# Patient Record
Sex: MALE | Race: WHITE | Employment: OTHER | URBAN - METROPOLITAN AREA
[De-identification: names, ages, dates, MRNs, and addresses within clinical notes are randomized per-mention and may not be internally consistent; named-entity substitution may affect disease eponyms.]

---

## 2021-04-09 NOTE — TELEPHONE ENCOUNTER
FUTURE VISIT INFORMATION      FUTURE VISIT INFORMATION:    Date: 5.3.21    Time: 1:15    Location: [facility]  REFERRAL INFORMATION:    Referring provider:  N/A    Referring providers clinic:  N/A    Reason for visit/diagnosis: Consult for eyelid surgery - heavy eyelids - needs eye exam too - referred  by friend    RECORDS REQUESTED FROM:       Clinic name Comments Records Status Imaging Status

## 2021-05-03 ENCOUNTER — PRE VISIT (OUTPATIENT)
Dept: OPHTHALMOLOGY | Facility: CLINIC | Age: 51
End: 2021-05-03

## 2021-05-03 ENCOUNTER — OFFICE VISIT (OUTPATIENT)
Dept: OPHTHALMOLOGY | Facility: CLINIC | Age: 51
End: 2021-05-03
Payer: COMMERCIAL

## 2021-05-03 ENCOUNTER — TELEPHONE (OUTPATIENT)
Dept: OPHTHALMOLOGY | Facility: CLINIC | Age: 51
End: 2021-05-03

## 2021-05-03 VITALS — WEIGHT: 190 LBS | BODY MASS INDEX: 29.82 KG/M2 | HEIGHT: 67 IN

## 2021-05-03 DIAGNOSIS — H02.834 DERMATOCHALASIS OF BOTH UPPER EYELIDS: Primary | ICD-10-CM

## 2021-05-03 DIAGNOSIS — H02.831 DERMATOCHALASIS OF BOTH UPPER EYELIDS: Primary | ICD-10-CM

## 2021-05-03 DIAGNOSIS — H02.9 EYELID LESION, BENIGN: ICD-10-CM

## 2021-05-03 PROCEDURE — 92002 INTRM OPH EXAM NEW PATIENT: CPT | Mod: GC | Performed by: OPHTHALMOLOGY

## 2021-05-03 PROCEDURE — 92285 EXTERNAL OCULAR PHOTOGRAPHY: CPT | Mod: GC | Performed by: OPHTHALMOLOGY

## 2021-05-03 PROCEDURE — 92082 INTERMEDIATE VISUAL FIELD XM: CPT | Mod: GC | Performed by: OPHTHALMOLOGY

## 2021-05-03 RX ORDER — DOLUTEGRAVIR SODIUM 50 MG/1
TABLET, FILM COATED ORAL EVERY MORNING
COMMUNITY
Start: 2019-10-12

## 2021-05-03 RX ORDER — INSULIN GLARGINE 300 U/ML
INJECTION, SOLUTION SUBCUTANEOUS DAILY
COMMUNITY
Start: 2021-04-10

## 2021-05-03 RX ORDER — METOPROLOL SUCCINATE 50 MG/1
50 TABLET, EXTENDED RELEASE ORAL EVERY MORNING
COMMUNITY
Start: 2021-04-10

## 2021-05-03 RX ORDER — PRAVASTATIN SODIUM 10 MG
10 TABLET ORAL EVERY MORNING
COMMUNITY

## 2021-05-03 RX ORDER — EMTRICITABINE, RILPIVIRINE HYDROCHLORIDE, AND TENOFOVIR DISOPROXIL FUMARATE 200; 25; 300 MG/1; MG/1; MG/1
TABLET, FILM COATED ORAL EVERY MORNING
COMMUNITY
Start: 2020-07-13

## 2021-05-03 RX ORDER — LISINOPRIL 10 MG/1
10 TABLET ORAL EVERY MORNING
COMMUNITY
Start: 2020-07-07 | End: 2021-08-20

## 2021-05-03 RX ORDER — INSULIN ASPART 100 [IU]/ML
INJECTION, SOLUTION INTRAVENOUS; SUBCUTANEOUS
COMMUNITY
Start: 2021-04-08

## 2021-05-03 RX ORDER — OMEGA-3-ACID ETHYL ESTERS 1 G/1
CAPSULE, LIQUID FILLED ORAL EVERY MORNING
COMMUNITY
Start: 2021-02-01

## 2021-05-03 ASSESSMENT — SLIT LAMP EXAM - LIDS: COMMENTS: DERMATOCHALASIS WITH SKIN RESTING ON LASHES

## 2021-05-03 ASSESSMENT — TONOMETRY
IOP_METHOD: ICARE
OD_IOP_MMHG: 16
OS_IOP_MMHG: 19

## 2021-05-03 ASSESSMENT — CONF VISUAL FIELD
METHOD: COUNTING FINGERS
OS_SUPERIOR_NASAL_RESTRICTION: 3
OS_SUPERIOR_TEMPORAL_RESTRICTION: 3
OD_SUPERIOR_TEMPORAL_RESTRICTION: 3

## 2021-05-03 ASSESSMENT — LAGOPHTHALMOS
OS_LAGOPHTHALMOS: 0
OD_LAGOPHTHALMOS: 0

## 2021-05-03 ASSESSMENT — EXTERNAL EXAM - LEFT EYE: OS_EXAM: NORMAL

## 2021-05-03 ASSESSMENT — VISUAL ACUITY
METHOD: SNELLEN - LINEAR
OD_SC: 20/20
OS_SC: 20/20

## 2021-05-03 ASSESSMENT — LEVATOR FUNCTION
OD_LEVATOR: 15
OS_LEVATOR: 15

## 2021-05-03 ASSESSMENT — EXTERNAL EXAM - RIGHT EYE: OD_EXAM: NORMAL

## 2021-05-03 ASSESSMENT — MIFFLIN-ST. JEOR: SCORE: 1675.46

## 2021-05-03 ASSESSMENT — MARGIN REFLEX DISTANCE
OD_MRD1: 2
OS_MRD1: 2

## 2021-05-03 NOTE — NURSING NOTE
Chief Complaints and History of Present Illnesses   Patient presents with     Consult For     Self referred reports heavy both upper lids.     Chief Complaint(s) and History of Present Illness(es)     Consult For     Laterality: both eyes    Associated symptoms: dryness, eye pain, redness and tearing    Comments: Self referred reports heavy both upper lids.              Comments     Patient states both upper lids dropping has been bothering the past 2 years.   Having to use forehead and really work to get each eye open wider. Has been getting headaches.   This zayra to be getting in the way of vision. Two growths on the peripheral corner of left upper lid the past 2 months and this seem to get the way of vision as well. They seem to be greater in size. No change in color or discharge. Lid itching with each eye.  Would like growths removed today if possible.     Yvette Ball, COT COT 1:18 PM May 3, 2021

## 2021-05-03 NOTE — PATIENT INSTRUCTIONS
BLEPHAROPLASTY    Your eyes are often the first thing people notice about you and are an important aspect of your overall appearance. As we age, the tone and shape of our eyelids can loosen and sag. Heredity and sun exposure also contribute to this process. This excess, puffy or lax skin can make you appear more tired or appear older. Eyelid surgery or blepharoplasty (pronounced  yndz-e-ut-plasty ) can give the eyes a more youthful look by removing excess skin, bulging fat, and lax muscle from the upper or lower eyelids. If the sagging upper eyelid skin obstructs peripheral vision, blepharoplasty can eliminate the obstruction and expand the visual field.     Upper Blepharoplasty     For the upper eyelids, excess skin and fat are removed through an incision hidden in the natural eyelid crease. If the lid is droopy (ptosis), the muscle that raises the upper eyelid can be tightened. The incision is then closed with fine sutures.     Lower Blepharoplasty     Fat in the lower eyelids can be removed or repositioned through an incision hidden on the inner surface of the eyelid.  If there is excessive skin in the lower lid, the skin can be removed through incision is made just below the lashes. Fat can be removed or repositioned through this incision, and the excess skin removed. The incision is then closed with fine sutures.     Upper and Lower Blepharoplasty     Upper and lower blepharoplasty can be performed together and also can be combined with other procedures such as eyebrow or forehead lift, midface lift, face lift, neck lift, or laser skin resurfacing.  The procedures are typically performed as an outpatient procedure and typically take 45 min to 1.5 hours to perform.  Most patients can return to normal activities within 1-2 weeks. Makeup may be worn to camouflage any bruising after one week.       Who Should Perform A Blepharoplasty?     When choosing a surgeon to perform blepharoplasty, look for a cosmetic and  reconstructive surgeon who specializes in the eyelids, orbit, and tear drain system. Dr. Freitas s membership in the American Society of Ophthalmic Plastic and Reconstructive Surgery (ASOPRS) indicates he is not only a board certified ophthalmologist who knows the anatomy and structure of the eyelids and orbit, but also has had extensive training in ophthalmic plastic reconstructive and cosmetic surgery.

## 2021-05-03 NOTE — TELEPHONE ENCOUNTER
Spoke with patient to schedule surgery with Dr. Freitas    Surgery was scheduled on 6/9 at [facility]  Patient will have H&P at [facility]     Patient is aware a COVID-19 test is needed before their procedure. The test should be with-in 4 days of their procedure.   Test Details: Date 6/7 Location [facility]    Post-Op visit was scheduled on 6/21  Patient is aware a / is needed day of surgery.   Surgery packet was mailed 5/3, patient has my direct contact information for any further questions.

## 2021-05-03 NOTE — NURSING NOTE
Chief Complaints and History of Present Illnesses   Patient presents with     Consult For     Self referred reports heavy both upper lids.     Chief Complaint(s) and History of Present Illness(es)     Consult For     Laterality: both eyes    Associated symptoms: dryness, eye pain, redness and tearing    Comments: Self referred reports heavy both upper lids.              Comments     Patient states both upper lids dropping has been bothering the past 2 years.   Having to use forehead and really work to get each eye open wider. Has been getting headaches.   This zayra to be getting in the way of vision. Two growths on the peripheral corner of left upper lid the past 2 months and this seem to get the way of vision as well. They seem to be greater in size. No change in color or discharge. Lid itching with each eye.  Would like growths removed today if possible.   No results found for: A1C      Yvette Ball, COT COT 1:18 PM May 3, 2021

## 2021-05-03 NOTE — PROGRESS NOTES
Chief Complaints and History of Present Illnesses   Patient presents with     Consult For     Self referred reports heavy both upper lids.     Chief Complaint(s) and History of Present Illness(es)     Consult For     In both eyes.  Associated symptoms include dryness, eye pain, redness and   tearing. Additional comments: Self referred reports heavy both upper lids.                Comments     Patient states both upper lids dropping has been bothering the past 2   years.   Having to use forehead and really work to get each eye open wider. Has   been getting headaches.   This zayra to be getting in the way of vision. Two growths on the peripheral   corner of left upper lid the past 2 months and this seem to get the way of   vision as well. They seem to be greater in size. No change in color or   discharge. Lid itching with each eye.  Would like growths removed today if   possible.   No results found for: A1C      Yvette Ball, COT COT 1:18 PM May 3, 2021            FUNCTIONAL COMPLAINTS RELATED TO DROOPY EYELIDS/BROWS:  Akira Bianchi describes upper lids interfering with superior visual field and interfering with activities of daily living including reading, driving and watching television.     EXAM:   Dominant eye left     MRD1: Right eye 2   Left eye 2  Dermatochalasis with excess skin touching eyelashes      VISUAL FIELD:  Right eye untaped:10 degrees Right eye taped:55 degrees  Left eye untaped:10 degrees Left eye taped:55 degrees    Right eye visual field improves by: 45 degrees  Left eye visual field improves by: 45 degrees           Assessment & Plan     Akira Bianchi is a 51 year old male with the following diagnoses:   1. Dermatochalasis of both upper eyelids    2. Eyelid lesion, benign       PLAN:  Bilateral upper blepharoplasty (S+sup orbic), skin lesions will be included in bleph excision          David Terrazas MD, MPH  Oculoplastics Fellow    Attending Physician Attestation:  Complete  documentation of historical and exam elements from today's encounter can be found in the full encounter summary report (not reduplicated in this progress note).  I personally obtained the chief complaint(s) and history of present illness.  I confirmed and edited as necessary the review of systems, past medical/surgical history, family history, social history, and examination findings as documented by others; and I examined the patient myself.  I personally reviewed the relevant tests, images, and reports as documented above.  I formulated and edited as necessary the assessment and plan and discussed the findings and management plan with the patient and family.  I personally reviewed the ophthalmic test(s) associated with this encounter, agree with the interpretation(s) as documented by the resident/fellow, and have edited the corresponding report(s) as necessary.   -Nirav Freitas MD    Today with Akira Bianchi, I reviewed the indications, risks, benefits, and alternatives of the proposed surgical procedure including, but not limited to, failure obtain the desired result  and need for additional surgery, bleeding, infection, loss of vision, loss of the eye, and the remote possibility of permanent damage to any organ system or death with the use of anesthesia.  I provided multiple opportunities for the questions, answered all questions to the best of my ability, and confirmed that my answers and my discussion were understood.     - Nirav Freitas MD 2:10 PM 5/3/2021

## 2021-05-04 NOTE — TELEPHONE ENCOUNTER
FUTURE VISIT INFORMATION      SURGERY INFORMATION:    Date: 21    Location: [facility]    Surgeon:  Nirav Freitas MD    Anesthesia Type:  Combined MAC with Local    Procedure: Bilateral upper eyelid blepharoplasty    Consult: ov 5/3/21    RECORDS REQUESTED FROM:       Most recent EKG+ Tracin13- Yusef

## 2021-05-22 DIAGNOSIS — Z11.59 ENCOUNTER FOR SCREENING FOR OTHER VIRAL DISEASES: ICD-10-CM

## 2021-06-06 ENCOUNTER — ANESTHESIA EVENT (OUTPATIENT)
Dept: SURGERY | Facility: AMBULATORY SURGERY CENTER | Age: 51
End: 2021-06-06
Payer: COMMERCIAL

## 2021-06-06 NOTE — ANESTHESIA PREPROCEDURE EVALUATION
Anesthesia Pre-Procedure Evaluation    Patient: Akira Bianchi   MRN: [id number] : 1970        Preoperative Diagnosis: Dermatochalasis of both upper eyelids [H02.831, H02.834]   Procedure : Procedure(s):  Bilateral upper eyelid blepharoplasty     No past medical history on file.   Past Surgical History:   Procedure Laterality Date     LASIK Bilateral       Allergies   Allergen Reactions     Glucotrol [Glipizide]      Tricor [Fenofibrate]       Social History     Tobacco Use     Smoking status: Not on file   Substance Use Topics     Alcohol use: Not on file      Wt Readings from Last 1 Encounters:   21 86.2 kg (190 lb)        Anesthesia Evaluation   Pt has had prior anesthetic. Type: General.    No history of anesthetic complications       ROS/MED HX  ENT/Pulmonary: Comment: Pulmonary blebs.   [id number] lungs collapsed requiring.   S/p pleurodesis.        (+) tobacco use, Past use,  (-) sleep apnea   Neurologic:     (+) peripheral neuropathy, - right two fingers. related to previous C6-C7 surgery.     Cardiovascular: Comment:  s/p stent x 1 in [place]. ( )    (+) Dyslipidemia hypertension-----    METS/Exercise Tolerance: >4 METS    Hematologic:  - neg hematologic  ROS     Musculoskeletal: Comment: History of C6-C7 surgery ( )      GI/Hepatic: Comment: Takes antacids regularly. Diet related.     (+) GERD, Other,     Renal/Genitourinary:  - neg Renal ROS     Endo:     (+) type I DM, Last HgA1c: 2020, date: 7.1, Using insulin, not previously admitted for DM/DKA.     Psychiatric/Substance Use:  - neg psychiatric ROS     Infectious Disease:     (+) HIV,     Malignancy:  - neg malignancy ROS     Other:            Physical Exam    Airway  airway exam normal           Respiratory Devices and Support         Dental  no notable dental history         Cardiovascular   cardiovascular exam normal          Pulmonary   pulmonary exam normal                OUTSIDE LABS:  CBC: No results found for:  WBC, HGB, HCT, PLT  BMP: No results found for: NA, POTASSIUM, CHLORIDE, CO2, BUN, CR, GLC  COAGS: No results found for: PTT, INR, FIBR  POC: No results found for: BGM, HCG, HCGS  HEPATIC: No results found for: ALBUMIN, PROTTOTAL, ALT, AST, GGT, ALKPHOS, BILITOTAL, BILIDIRECT, LANIE  OTHER: No results found for: PH, LACT, A1C, GERBER, PHOS, MAG, LIPASE, AMYLASE, TSH, T4, T3, CRP, SED    Anesthesia Plan    ASA Status:  2   NPO Status:  NPO Appropriate    Anesthesia Type: MAC.     - Reason for MAC: straight local not clinically adequate   Induction: Intravenous.   Maintenance: TIVA.        Consents    Anesthesia Plan(s) and associated risks, benefits, and realistic alternatives discussed. Questions answered and patient/representative(s) expressed understanding.     - Discussed with:  Patient      - Extended Intubation/Ventilatory Support Discussed: No.      - Patient is DNR/DNI Status: No    Use of blood products discussed: No .     Postoperative Care    Pain management: Multi-modal analgesia.   PONV prophylaxis: Background Propofol Infusion     Comments:              PAC Discussion and Assessment    ASA Classification: 3  Case is suitable for: [facility]  Anesthetic techniques and relevant risks discussed: MAC with GA as backup  Invasive monitoring and risk discussed: No    Possibility and Risk of blood transfusion discussed: No            [facility] Resident/NP Anesthesia Assessment: Abilio Bianchi is a Scheduled for Dermatochalasis of both upper eyelids on 6/9/2021 by Fortino in treatment of Bilateral upper eyelid blepharoplasty . PAC referral for risk assessment and optimization for anesthesia with comorbid conditions of the following :  Pre-operative considerations:  1. Cardiac: Functional status- METS 4.  low  risk surgery with RCRI: low risk of major adverse cardiac event.   s/p stent x 1 in [place]. ( 2007) Denies CP or KRAMER.   HTN- controlled on lisinopril and metoprolol. HL on statins.   2. Pulm: Airway feasible. JAVY #  3/8 of risks. Former smoker . Denies SOB   hx of pulmonary blebs. s/p   0379-7159 lungs collapsed requiring chest tubes. S/p pleurodesis in 2018.  3. GI: Risk of PONV score = 1. If > 2, anti-emetic intervention recommended) DM1 requiring insulin. Last A1C 11/2020 7.1%  + GERD   4.Heme: Hx of HIV  5. Musc: previous C5-C7 fusion.   6. Neuro: right two fingers numb-   - VTE risk: 0.5%  Patient is optimized and is acceptable candidate for the proposed procedure. No further diagnostic evaluation is needed.  For further details of assessment, testing, and physical exam please see H and P completed on same date.    Reviewed and Signed by PAC Mid-Level Provider/Resident  Mid-Level Provider/Resident: Bhakti LEES CNP  Date: 6/7/2021                                 NABEEL Mosley CNP       Antoine Weiss MD  Staff Anesthesiologist  [phone]

## 2021-06-07 ENCOUNTER — PRE VISIT (OUTPATIENT)
Dept: SURGERY | Facility: CLINIC | Age: 51
End: 2021-06-07

## 2021-06-07 ENCOUNTER — OFFICE VISIT (OUTPATIENT)
Dept: SURGERY | Facility: CLINIC | Age: 51
End: 2021-06-07
Payer: COMMERCIAL

## 2021-06-07 ENCOUNTER — APPOINTMENT (OUTPATIENT)
Dept: LAB | Facility: CLINIC | Age: 51
End: 2021-06-07
Payer: COMMERCIAL

## 2021-06-07 VITALS
BODY MASS INDEX: 30.97 KG/M2 | WEIGHT: 197.3 LBS | DIASTOLIC BLOOD PRESSURE: 83 MMHG | HEART RATE: 84 BPM | SYSTOLIC BLOOD PRESSURE: 124 MMHG | OXYGEN SATURATION: 93 % | TEMPERATURE: 97.7 F | HEIGHT: 67 IN | RESPIRATION RATE: 20 BRPM

## 2021-06-07 DIAGNOSIS — Z11.59 ENCOUNTER FOR SCREENING FOR OTHER VIRAL DISEASES: ICD-10-CM

## 2021-06-07 DIAGNOSIS — Z01.818 PRE-OP EVALUATION: Primary | ICD-10-CM

## 2021-06-07 DIAGNOSIS — Z01.818 PRE-OP EVALUATION: ICD-10-CM

## 2021-06-07 LAB
ANION GAP SERPL CALCULATED.3IONS-SCNC: 8 MMOL/L (ref 3–14)
BUN SERPL-MCNC: 13 MG/DL (ref 7–30)
CALCIUM SERPL-MCNC: 9.6 MG/DL (ref 8.5–10.1)
CHLORIDE SERPL-SCNC: 106 MMOL/L (ref 94–109)
CO2 SERPL-SCNC: 26 MMOL/L (ref 20–32)
CREAT SERPL-MCNC: 0.95 MG/DL (ref 0.66–1.25)
GFR SERPL CREATININE-BSD FRML MDRD: >90 ML/MIN/{1.73_M2}
GLUCOSE SERPL-MCNC: 160 MG/DL (ref 70–99)
HBA1C MFR BLD: 8.5 % (ref 0–5.6)
LABORATORY COMMENT REPORT: NORMAL
POTASSIUM SERPL-SCNC: 4 MMOL/L (ref 3.4–5.3)
SARS-COV-2 RNA RESP QL NAA+PROBE: NEGATIVE
SARS-COV-2 RNA RESP QL NAA+PROBE: NORMAL
SODIUM SERPL-SCNC: 140 MMOL/L (ref 133–144)
SPECIMEN SOURCE: NORMAL
SPECIMEN SOURCE: NORMAL

## 2021-06-07 PROCEDURE — 80048 BASIC METABOLIC PNL TOTAL CA: CPT | Performed by: PATHOLOGY

## 2021-06-07 PROCEDURE — U0005 INFEC AGEN DETEC AMPLI PROBE: HCPCS | Mod: 90 | Performed by: PATHOLOGY

## 2021-06-07 PROCEDURE — 36415 COLL VENOUS BLD VENIPUNCTURE: CPT | Performed by: PATHOLOGY

## 2021-06-07 PROCEDURE — U0003 INFECTIOUS AGENT DETECTION BY NUCLEIC ACID (DNA OR RNA); SEVERE ACUTE RESPIRATORY SYNDROME CORONAVIRUS 2 (SARS-COV-2) (CORONAVIRUS DISEASE [COVID-19]), AMPLIFIED PROBE TECHNIQUE, MAKING USE OF HIGH THROUGHPUT TECHNOLOGIES AS DESCRIBED BY CMS-2020-01-R: HCPCS | Mod: 90 | Performed by: PATHOLOGY

## 2021-06-07 PROCEDURE — 99204 OFFICE O/P NEW MOD 45 MIN: CPT | Performed by: NURSE PRACTITIONER

## 2021-06-07 PROCEDURE — 83036 HEMOGLOBIN GLYCOSYLATED A1C: CPT | Performed by: PATHOLOGY

## 2021-06-07 RX ORDER — TRIPROLIDINE/PSEUDOEPHEDRINE 2.5MG-60MG
1 TABLET ORAL EVERY MORNING
COMMUNITY

## 2021-06-07 ASSESSMENT — PAIN SCALES - GENERAL: PAINLEVEL: NO PAIN (0)

## 2021-06-07 ASSESSMENT — MIFFLIN-ST. JEOR: SCORE: 1708.58

## 2021-06-07 ASSESSMENT — LIFESTYLE VARIABLES: TOBACCO_USE: 1

## 2021-06-07 NOTE — H&P (VIEW-ONLY)
Pre-Operative H & P     CC:  Preoperative exam to assess for increased cardiopulmonary risk while undergoing surgery and anesthesia.    Date of Encounter: 6/7/2021  Primary Care Physician:  No primary care provider on file.    Reason for visit: Dermatochalasis of both upper eyelids    HPI  Akira Bianchi is a 51 year old male who presents for pre-operative H & P in preparation for Bilateral upper eyelid blepharoplastywith Dr. Freitas on 6/9/21 at [facility].     Patient states both upper lids dropping has been bothering the past 2 years. Having to use forehead and really work to get each eye open wider. Has been getting headaches. This is getting in the way of his vision. Two growths on the peripheral corner of left upper lid the past 2 months and this seem to get the way of vision as well. Patient states that the growths are getting bigger in size. He complains of Lid itching with each eye. Patient consulted with Dr. Freitas and the above has been recommended for treatment.     PMH also siginficant for: CAD s/p coronary stent 2007, Pulmonary blebs s/p pleurodesis, HTN, HL, former smoker, HIV, spinal fusion.           History is obtained from the patient.     Past Medical History  Also includes :   CAD- s/p stent 20007  HTN   Pulmonary blebs/ s/p pleurodesis  HL  Spinal fusion  HIV  Former smoker      Past Surgical History  Past Surgical History:   Procedure Laterality Date     LASIK Bilateral    Spinal fusion C5-C7        Hx of Blood transfusions/reactions: no     Hx of abnormal bleeding or anti-platelet use: no    Menstrual history: No LMP for male patient.:     Steroid use in the last year: no    Personal or FH with difficulty with Anesthesia:  no    Prior to Admission Medications  Current Outpatient Medications   Medication Sig Dispense Refill     dolutegravir (TIVICAY) 50 MG tablet every morning        emtricitabine-rilpivir-tenofovir (COMPLERA) 200- MG TABS per tablet every  morning        insulin aspart (NOVOLOG PEN) 100 UNIT/ML pen Inject 180-200 Units Subcutaneous 3 times daily (with meals)        insulin glargine (LANTUS SOLOSTAR) 100 UNIT/ML pen Inject 140 Units Subcutaneous daily        lisinopril (ZESTRIL) 10 MG tablet Take 10 mg by mouth every morning        metoprolol succinate ER (TOPROL-XL) 50 MG 24 hr tablet 50 mg every morning        omega-3 acid ethyl esters (LOVAZA) 1 g capsule every morning        pravastatin (PRAVACHOL) 10 MG tablet Take 10 mg by mouth every morning        triprolidine-pseudoePHEDrine (APRODINE) 2.5-60 MG TABS per tablet Take 1 tablet by mouth every morning       NOVOLOG FLEXPEN 100 UNIT/ML soln        TOUJEO MAX SOLOSTAR 300 UNIT/ML SOPN daily          Allergies  Allergies   Allergen Reactions     Glucotrol [Glipizide]      Tricor [Fenofibrate]        Social History  Social History     Socioeconomic History     Marital status:      Spouse name: Not on file     Number of children: Not on file     Years of education: Not on file     Highest education level: Not on file   Occupational History     Not on file   Social Needs     Financial resource strain: Not on file     Food insecurity     Worry: Not on file     Inability: Not on file     Transportation needs     Medical: Not on file     Non-medical: Not on file   Tobacco Use     Smoking status: Never Smoker     Smokeless tobacco: Never Used   Substance and Sexual Activity     Alcohol use: Not on file     Drug use: Not on file     Sexual activity: Not on file   Lifestyle     Physical activity     Days per week: Not on file     Minutes per session: Not on file     Stress: Not on file   Relationships     Social connections     Talks on phone: Not on file     Gets together: Not on file     Attends Shinto service: Not on file     Active member of club or organization: Not on file     Attends meetings of clubs or organizations: Not on file     Relationship status: Not on file     Intimate partner  "violence     Fear of current or ex partner: Not on file     Emotionally abused: Not on file     Physically abused: Not on file     Forced sexual activity: Not on file   Other Topics Concern     Not on file   Social History Narrative     Not on file       Family History  No family history on file.    The complete review of systems is negative other than noted in the HPI or here.       ENT/Pulmonary: Comment: Pulmonary blebs.   6499-1949 lungs collapsed requiring.   S/p pleurodesis.        (+) tobacco use, Past use,  (-) sleep apnea   Neurologic:     (+) peripheral neuropathy, - right two fingers. related to previous C6-C7 surgery.     Cardiovascular: Comment:  s/p stent x 1 in [place]. ( 2007)    (+) Dyslipidemia hypertension-----    METS/Exercise Tolerance: >4 METS    Hematologic:  - neg hematologic  ROS     Musculoskeletal: Comment: History of C6-C7 surgery ( 2009)      GI/Hepatic: Comment: Takes antacids regularly. Diet related.     (+) GERD, Other,     Renal/Genitourinary:  - neg Renal ROS     Endo:     (+) type I DM, Last HgA1c: 11/25/2020, date: 7.1, Using insulin, not previously admitted for DM/DKA.     Psychiatric/Substance Use:  - neg psychiatric ROS     Infectious Disease:  - neg infectious disease ROS     Malignancy:  - neg malignancy ROS     Other:                Temp: 97.7  F (36.5  C) Temp src: Oral BP: 124/83 Pulse: 84   Resp: 20 SpO2: 93 %         197 lbs 4.8 oz  5' 7\"[pt reported[   Body mass index is 30.9 kg/m .       Physical Exam  Constitutional: Awake, alert, cooperative, no apparent distress, and appears stated age.  Eyes: Pupils equal, round and reactive to light, extra ocular muscles intact, sclera clear, conjunctiva normal.  HENT: Normocephalic, oral pharynx with moist mucus membranes, good dentition. No goiter appreciated.   Respiratory: Clear to auscultation bilaterally, no crackles or wheezing.  Cardiovascular: Regular rate and rhythm, normal S1 and S2, and no murmur noted.  Carotids " +2, no bruits. No edema. Palpable pulses to radial  DP and PT arteries.   GI: Normal bowel sounds, soft, non-distended, non-tender, no masses palpated, no hepatosplenomegaly.  Surgical scars: healed  Lymph/Hematologic: No cervical lymphadenopathy and no supraclavicular lymphadenopathy.  Genitourinary:  deferred  Skin: Warm and dry.  No rashes at anticipated surgical site.   Musculoskeletal: Full ROM of neck. There is no redness, warmth, or swelling of the joints. Gross motor strength is normal.    Neurologic: Awake, alert, oriented to name, place and time. Cranial nerves II-XII are grossly intact. Gait is normal.   Neuropsychiatric: Calm, cooperative. Normal affect.     Labs: (personally reviewed)  basic metabolic panel  Last Comprehensive Metabolic Panel:  Sodium   Date Value Ref Range Status   06/07/2021 140 133 - 144 mmol/L Final     Potassium   Date Value Ref Range Status   06/07/2021 4.0 3.4 - 5.3 mmol/L Final     Chloride   Date Value Ref Range Status   06/07/2021 106 94 - 109 mmol/L Final     Carbon Dioxide   Date Value Ref Range Status   06/07/2021 26 20 - 32 mmol/L Final     Anion Gap   Date Value Ref Range Status   06/07/2021 8 3 - 14 mmol/L Final     Glucose   Date Value Ref Range Status   06/07/2021 160 (H) 70 - 99 mg/dL Final     Urea Nitrogen   Date Value Ref Range Status   06/07/2021 13 7 - 30 mg/dL Final     Creatinine   Date Value Ref Range Status   06/07/2021 0.95 0.66 - 1.25 mg/dL Final     GFR Estimate   Date Value Ref Range Status   06/07/2021 >90 >60 mL/min/[1.73_m2] Final     Comment:     Non  GFR Calc  Starting 12/18/2018, serum creatinine based estimated GFR (eGFR) will be   calculated using the Chronic Kidney Disease Epidemiology Collaboration   (CKD-EPI) equation.       Calcium   Date Value Ref Range Status   06/07/2021 9.6 8.5 - 10.1 mg/dL Final       Outside records reviewed from: care everywhere    ASSESSMENT and PLAN  Abilio Bianchi is a Scheduled for Dermatochalasis  of both upper eyelids on 6/9/2021 by Fortino in treatment of Bilateral upper eyelid blepharoplasty . PAC referral for risk assessment and optimization for anesthesia with comorbid conditions of the following :  Pre-operative considerations:  1. Cardiac: Functional status- METS 4.  low  risk surgery with RCRI: low risk of major adverse cardiac event.   s/p stent x 1 in [place]. ( 2007) Denies CP or KRAMER.   HTN- controlled on lisinopril and metoprolol. HL on statins.   2. Pulm: Airway feasible. JAVY # 3/8 of risks. Former smoker . Denies SOB   hx of pulmonary blebs. s/p   [id number] lungs collapsed requiring chest tubes. S/p pleurodesis in 2018.  3. GI: Risk of PONV score = 1. If > 2, anti-emetic intervention recommended) DM1 requiring insulin. Last A1C 11/2020 7.1%. will obtain updated A1C today.   + GERD   4.Heme: Hx of HIV  5. Musc: previous C5-C7 fusion.   6. Neuro: right two fingers numb-   - VTE risk: 0.5%    Arrival time, NPO, shower and medication instructions provided by nursing staff today,please refer to the AVS completed by nursing.   Preparing For Your Surgery handout given in clinic.        NABEEL Mosley CNP  Preoperative Assessment Center  [facility]  Phone: [phone]  Fax: [phone]

## 2021-06-07 NOTE — PATIENT INSTRUCTIONS
Preparing for Your Surgery      Name:  Akira Bianchi   MRN:  [id number]   :  1970   Today's Date:  2021         Arriving for surgery:  Surgery date:  21  Arrival time:  11:15 am    Restrictions due to COVID 19:  One consistent visitor is allowed per patient  No ill visitors  All visitors must wear face mask     parking is available for anyone with mobility limitations or disabilities. (Monday- Friday 7 am- 5 pm)    Please come to:    [facility]  [address] [place] [phone]    Please check in on the 5th floor at the Ambulatory Surgery Center       What can I eat or drink?    -  You may eat and drink normally until 8 hours before surgery. (Until 4:45 am)  -  You may have clear liquids up to 4 hours before surgery. (Until 8:45 am)  Examples of clear liquids:  Water  Clear broth  Juices (apple, white grape, white cranberry  and cider) without pulp  Noncarbonated, powder based beverages  (lemonade and Ricardo-Aid)  Sodas (Sprite, 7-Up, ginger ale and seltzer)  Coffee or tea (without milk or cream)  Gatorade    --No alcohol for at least 24 hours before surgery    Which medicines can I take?    Hold Aspirin for 7 days before surgery.   Hold Multivitamins for 7 days before surgery.  Hold Supplements for 7 days before surgery. Hold Omega 3 Acid Ethyl Esters (Lovaza) now, if you have not already.  Hold Ibuprofen (Advil, Motrin) for 1 day before surgery--unless otherwise directed by surgeon.  Hold Naproxen (Aleve) for 4 days before surgery.    -  DO NOT take the following medications the day of surgery:  Aspart insulin    -  PLEASE TAKE the following medications the day of surgery   Dolutegravir (Tivicay), Emtricitabine-Rilpivir-Tenofovir (Complera), Lisinoprol, Metoprolol, Pravastatin (Pravachol), Triprolidine-Pseudoephedrine (Aprodine),   Acetaminophen (Tylenol) if needed    The morning of surgery, decrease the Glargine (Lantus) insulin to 112 units.    How do I  prepare myself?  - Please take 2 showers before surgery using Scrubcare or Hibiclens soap.    Use this soap only from the neck to your toes. Do not put the soap anywhere near your eyes.     Leave the soap on your skin for one minute--then rinse thoroughly.      You may use your own shampoo and conditioner; no other hair products.   - Please remove all jewelry and body piercings.  - No lotions, deodorants or fragrance.  - Bring your ID and insurance card.        - All patients are required to have a Covid-19 test within 4 days of surgery/procedure.      -Patients will be contacted by the [facility] scheduling team within 1 week of surgery to make an appointment.      - Patients may call the Scheduling team at [phone] if they have not been scheduled within 4 days of  surgery.      ALL PATIENTS ARE REQUIRED TO HAVE A RESPONSIBLE ADULT TO DRIVE AND BE IN ATTENDANCE WITH THEM FOR 24 HOURS FOLLOWING SURGERY       Questions or Concerns:    -For questions regarding the day of surgery please contact the Ambulatory Surgery Center at [phone].    -If you have health changes between today and your surgery please contact your surgeon.     For questions after surgery please call your surgeons office.    AFTER YOUR SURGERY  Breathing exercises   Breathing exercises help you recover faster. Take deep breaths and let the air out slowly. This will:     Help you wake up after surgery.    Help prevent complications like pneumonia.  Preventing complications will help you go home sooner.   Nausea and vomiting   You may feel sick to your stomach after surgery; if so, let your nurse know.    Pain control:  After surgery, you may have pain. Our goal is to help you manage your pain. Pain medicine will help you feel comfortable enough to do activities that will help you heal.  These activities may include breathing exercises, walking and physical therapy.   To help your health care team treat your pain we will ask: 1) If you  have pain  2) where it is located 3) describe your pain in your words  Methods of pain control include medications given by mouth, vein or by nerve block for some surgeries.  Sequential Compression Device (SCD):  You may need to wear SCD S (also called pneumo boots)on your legs or feet. These are wraps connected to a machine that pumps in air and releases it. The repeated pumping helps prevent blood clots from forming.

## 2021-06-07 NOTE — H&P
Pre-Operative H & P     CC:  Preoperative exam to assess for increased cardiopulmonary risk while undergoing surgery and anesthesia.    Date of Encounter: 6/7/2021  Primary Care Physician:  No primary care provider on file.    Reason for visit: Dermatochalasis of both upper eyelids    HPI  Akira Bianchi is a 51 year old male who presents for pre-operative H & P in preparation for Bilateral upper eyelid blepharoplastywith Dr. Freitas on 6/9/21 at [facility].     Patient states both upper lids dropping has been bothering the past 2 years. Having to use forehead and really work to get each eye open wider. Has been getting headaches. This is getting in the way of his vision. Two growths on the peripheral corner of left upper lid the past 2 months and this seem to get the way of vision as well. Patient states that the growths are getting bigger in size. He complains of Lid itching with each eye. Patient consulted with Dr. Freitas and the above has been recommended for treatment.     PMH also siginficant for: CAD s/p coronary stent 2007, Pulmonary blebs s/p pleurodesis, HTN, HL, former smoker, HIV, spinal fusion.           History is obtained from the patient.     Past Medical History  Also includes :   CAD- s/p stent 20007  HTN   Pulmonary blebs/ s/p pleurodesis  HL  Spinal fusion  HIV  Former smoker      Past Surgical History  Past Surgical History:   Procedure Laterality Date     LASIK Bilateral    Spinal fusion C5-C7        Hx of Blood transfusions/reactions: no     Hx of abnormal bleeding or anti-platelet use: no    Menstrual history: No LMP for male patient.:     Steroid use in the last year: no    Personal or FH with difficulty with Anesthesia:  no    Prior to Admission Medications  Current Outpatient Medications   Medication Sig Dispense Refill     dolutegravir (TIVICAY) 50 MG tablet every morning        emtricitabine-rilpivir-tenofovir (COMPLERA) 200- MG TABS per tablet every  morning        insulin aspart (NOVOLOG PEN) 100 UNIT/ML pen Inject 180-200 Units Subcutaneous 3 times daily (with meals)        insulin glargine (LANTUS SOLOSTAR) 100 UNIT/ML pen Inject 140 Units Subcutaneous daily        lisinopril (ZESTRIL) 10 MG tablet Take 10 mg by mouth every morning        metoprolol succinate ER (TOPROL-XL) 50 MG 24 hr tablet 50 mg every morning        omega-3 acid ethyl esters (LOVAZA) 1 g capsule every morning        pravastatin (PRAVACHOL) 10 MG tablet Take 10 mg by mouth every morning        triprolidine-pseudoePHEDrine (APRODINE) 2.5-60 MG TABS per tablet Take 1 tablet by mouth every morning       NOVOLOG FLEXPEN 100 UNIT/ML soln        TOUJEO MAX SOLOSTAR 300 UNIT/ML SOPN daily          Allergies  Allergies   Allergen Reactions     Glucotrol [Glipizide]      Tricor [Fenofibrate]        Social History  Social History     Socioeconomic History     Marital status:      Spouse name: Not on file     Number of children: Not on file     Years of education: Not on file     Highest education level: Not on file   Occupational History     Not on file   Social Needs     Financial resource strain: Not on file     Food insecurity     Worry: Not on file     Inability: Not on file     Transportation needs     Medical: Not on file     Non-medical: Not on file   Tobacco Use     Smoking status: Never Smoker     Smokeless tobacco: Never Used   Substance and Sexual Activity     Alcohol use: Not on file     Drug use: Not on file     Sexual activity: Not on file   Lifestyle     Physical activity     Days per week: Not on file     Minutes per session: Not on file     Stress: Not on file   Relationships     Social connections     Talks on phone: Not on file     Gets together: Not on file     Attends Druze service: Not on file     Active member of club or organization: Not on file     Attends meetings of clubs or organizations: Not on file     Relationship status: Not on file     Intimate partner  "violence     Fear of current or ex partner: Not on file     Emotionally abused: Not on file     Physically abused: Not on file     Forced sexual activity: Not on file   Other Topics Concern     Not on file   Social History Narrative     Not on file       Family History  No family history on file.    The complete review of systems is negative other than noted in the HPI or here.       ENT/Pulmonary: Comment: Pulmonary blebs.   [id number] lungs collapsed requiring.   S/p pleurodesis.        (+) tobacco use, Past use,  (-) sleep apnea   Neurologic:     (+) peripheral neuropathy, - right two fingers. related to previous C6-C7 surgery.     Cardiovascular: Comment:  s/p stent x 1 in [place]. ( 2007)    (+) Dyslipidemia hypertension-----    METS/Exercise Tolerance: >4 METS    Hematologic:  - neg hematologic  ROS     Musculoskeletal: Comment: History of C6-C7 surgery ( 2009)      GI/Hepatic: Comment: Takes antacids regularly. Diet related.     (+) GERD, Other,     Renal/Genitourinary:  - neg Renal ROS     Endo:     (+) type I DM, Last HgA1c: 11/25/2020, date: 7.1, Using insulin, not previously admitted for DM/DKA.     Psychiatric/Substance Use:  - neg psychiatric ROS     Infectious Disease:  - neg infectious disease ROS     Malignancy:  - neg malignancy ROS     Other:                Temp: 97.7  F (36.5  C) Temp src: Oral BP: 124/83 Pulse: 84   Resp: 20 SpO2: 93 %         197 lbs 4.8 oz  5' 7\"[pt reported[   Body mass index is 30.9 kg/m .       Physical Exam  Constitutional: Awake, alert, cooperative, no apparent distress, and appears stated age.  Eyes: Pupils equal, round and reactive to light, extra ocular muscles intact, sclera clear, conjunctiva normal.  HENT: Normocephalic, oral pharynx with moist mucus membranes, good dentition. No goiter appreciated.   Respiratory: Clear to auscultation bilaterally, no crackles or wheezing.  Cardiovascular: Regular rate and rhythm, normal S1 and S2, and no murmur noted.  Carotids " +2, no bruits. No edema. Palpable pulses to radial  DP and PT arteries.   GI: Normal bowel sounds, soft, non-distended, non-tender, no masses palpated, no hepatosplenomegaly.  Surgical scars: healed  Lymph/Hematologic: No cervical lymphadenopathy and no supraclavicular lymphadenopathy.  Genitourinary:  deferred  Skin: Warm and dry.  No rashes at anticipated surgical site.   Musculoskeletal: Full ROM of neck. There is no redness, warmth, or swelling of the joints. Gross motor strength is normal.    Neurologic: Awake, alert, oriented to name, place and time. Cranial nerves II-XII are grossly intact. Gait is normal.   Neuropsychiatric: Calm, cooperative. Normal affect.     Labs: (personally reviewed)  basic metabolic panel  Last Comprehensive Metabolic Panel:  Sodium   Date Value Ref Range Status   06/07/2021 140 133 - 144 mmol/L Final     Potassium   Date Value Ref Range Status   06/07/2021 4.0 3.4 - 5.3 mmol/L Final     Chloride   Date Value Ref Range Status   06/07/2021 106 94 - 109 mmol/L Final     Carbon Dioxide   Date Value Ref Range Status   06/07/2021 26 20 - 32 mmol/L Final     Anion Gap   Date Value Ref Range Status   06/07/2021 8 3 - 14 mmol/L Final     Glucose   Date Value Ref Range Status   06/07/2021 160 (H) 70 - 99 mg/dL Final     Urea Nitrogen   Date Value Ref Range Status   06/07/2021 13 7 - 30 mg/dL Final     Creatinine   Date Value Ref Range Status   06/07/2021 0.95 0.66 - 1.25 mg/dL Final     GFR Estimate   Date Value Ref Range Status   06/07/2021 >90 >60 mL/min/[1.73_m2] Final     Comment:     Non  GFR Calc  Starting 12/18/2018, serum creatinine based estimated GFR (eGFR) will be   calculated using the Chronic Kidney Disease Epidemiology Collaboration   (CKD-EPI) equation.       Calcium   Date Value Ref Range Status   06/07/2021 9.6 8.5 - 10.1 mg/dL Final       Outside records reviewed from: care everywhere    ASSESSMENT and PLAN  Abilio Bianchi is a Scheduled for Dermatochalasis  of both upper eyelids on 6/9/2021 by [facility] in treatment of Bilateral upper eyelid blepharoplasty . PAC referral for risk assessment and optimization for anesthesia with comorbid conditions of the following :  Pre-operative considerations:  1. Cardiac: Functional status- METS 4.  low  risk surgery with RCRI: low risk of major adverse cardiac event.   s/p stent x 1 in [place]. ( 2007) Denies CP or KRAMER.   HTN- controlled on lisinopril and metoprolol. HL on statins.   2. Pulm: Airway feasible. JAVY # 3/8 of risks. Former smoker . Denies SOB   hx of pulmonary blebs. s/p   [id number] lungs collapsed requiring chest tubes. S/p pleurodesis in 2018.  3. GI: Risk of PONV score = 1. If > 2, anti-emetic intervention recommended) DM1 requiring insulin. Last A1C 11/2020 7.1%. will obtain updated A1C today.   + GERD   4.Heme: Hx of HIV  5. Musc: previous C5-C7 fusion.   6. Neuro: right two fingers numb-   - VTE risk: 0.5%    Arrival time, NPO, shower and medication instructions provided by nursing staff today,please refer to the AVS completed by nursing.   Preparing For Your Surgery handout given in clinic.        NABEEL Mosley CNP  Preoperative Assessment Center  [facility]  Phone: [phone]  Fax: [phone]

## 2021-06-08 RX ORDER — MEPERIDINE HYDROCHLORIDE 25 MG/ML
12.5 INJECTION INTRAMUSCULAR; INTRAVENOUS; SUBCUTANEOUS
Status: CANCELLED | OUTPATIENT
Start: 2021-06-08

## 2021-06-08 RX ORDER — HYDROMORPHONE HYDROCHLORIDE 1 MG/ML
.3-.5 INJECTION, SOLUTION INTRAMUSCULAR; INTRAVENOUS; SUBCUTANEOUS EVERY 10 MIN PRN
Status: CANCELLED | OUTPATIENT
Start: 2021-06-08

## 2021-06-08 RX ORDER — NALOXONE HYDROCHLORIDE 0.4 MG/ML
0.4 INJECTION, SOLUTION INTRAMUSCULAR; INTRAVENOUS; SUBCUTANEOUS
Status: CANCELLED | OUTPATIENT
Start: 2021-06-08 | End: 2021-06-09

## 2021-06-08 RX ORDER — ONDANSETRON 4 MG/1
4 TABLET, ORALLY DISINTEGRATING ORAL EVERY 30 MIN PRN
Status: CANCELLED | OUTPATIENT
Start: 2021-06-08

## 2021-06-08 RX ORDER — FENTANYL CITRATE 50 UG/ML
25-50 INJECTION, SOLUTION INTRAMUSCULAR; INTRAVENOUS
Status: CANCELLED | OUTPATIENT
Start: 2021-06-08

## 2021-06-08 RX ORDER — NALOXONE HYDROCHLORIDE 0.4 MG/ML
0.2 INJECTION, SOLUTION INTRAMUSCULAR; INTRAVENOUS; SUBCUTANEOUS
Status: CANCELLED | OUTPATIENT
Start: 2021-06-08 | End: 2021-06-09

## 2021-06-08 RX ORDER — OXYCODONE HYDROCHLORIDE 5 MG/1
5 TABLET ORAL EVERY 4 HOURS PRN
Status: CANCELLED | OUTPATIENT
Start: 2021-06-08

## 2021-06-08 RX ORDER — SODIUM CHLORIDE, SODIUM LACTATE, POTASSIUM CHLORIDE, CALCIUM CHLORIDE 600; 310; 30; 20 MG/100ML; MG/100ML; MG/100ML; MG/100ML
INJECTION, SOLUTION INTRAVENOUS CONTINUOUS
Status: CANCELLED | OUTPATIENT
Start: 2021-06-08

## 2021-06-08 RX ORDER — ONDANSETRON 2 MG/ML
4 INJECTION INTRAMUSCULAR; INTRAVENOUS EVERY 30 MIN PRN
Status: CANCELLED | OUTPATIENT
Start: 2021-06-08

## 2021-06-09 ENCOUNTER — ANESTHESIA (OUTPATIENT)
Dept: SURGERY | Facility: AMBULATORY SURGERY CENTER | Age: 51
End: 2021-06-09
Payer: COMMERCIAL

## 2021-06-09 ENCOUNTER — HOSPITAL ENCOUNTER (OUTPATIENT)
Facility: AMBULATORY SURGERY CENTER | Age: 51
End: 2021-06-09
Attending: OPHTHALMOLOGY
Payer: COMMERCIAL

## 2021-06-09 VITALS
BODY MASS INDEX: 29.82 KG/M2 | HEIGHT: 67 IN | SYSTOLIC BLOOD PRESSURE: 150 MMHG | WEIGHT: 190 LBS | OXYGEN SATURATION: 97 % | DIASTOLIC BLOOD PRESSURE: 87 MMHG | TEMPERATURE: 97 F | HEART RATE: 78 BPM | RESPIRATION RATE: 16 BRPM

## 2021-06-09 DIAGNOSIS — H02.834 DERMATOCHALASIS OF BOTH UPPER EYELIDS: ICD-10-CM

## 2021-06-09 DIAGNOSIS — H02.831 DERMATOCHALASIS OF BOTH UPPER EYELIDS: ICD-10-CM

## 2021-06-09 LAB — GLUCOSE BLDC GLUCOMTR-MCNC: 231 MG/DL (ref 70–99)

## 2021-06-09 PROCEDURE — 15823 BLEPHARP UPR EYELID XCSV SKN: CPT | Mod: 50

## 2021-06-09 RX ORDER — LIDOCAINE HYDROCHLORIDE AND EPINEPHRINE 10; 10 MG/ML; UG/ML
INJECTION, SOLUTION INFILTRATION; PERINEURAL PRN
Status: DISCONTINUED | OUTPATIENT
Start: 2021-06-09 | End: 2021-06-09 | Stop reason: HOSPADM

## 2021-06-09 RX ORDER — OXYCODONE HYDROCHLORIDE 5 MG/1
5 TABLET ORAL EVERY 6 HOURS PRN
Qty: 6 TABLET | Refills: 0 | Status: SHIPPED | OUTPATIENT
Start: 2021-06-09 | End: 2021-06-12

## 2021-06-09 RX ORDER — ACETAMINOPHEN 325 MG/1
975 TABLET ORAL ONCE
Status: COMPLETED | OUTPATIENT
Start: 2021-06-09 | End: 2021-06-09

## 2021-06-09 RX ORDER — PROPOFOL 10 MG/ML
INJECTION, EMULSION INTRAVENOUS PRN
Status: DISCONTINUED | OUTPATIENT
Start: 2021-06-09 | End: 2021-06-09

## 2021-06-09 RX ORDER — GABAPENTIN 300 MG/1
300 CAPSULE ORAL ONCE
Status: COMPLETED | OUTPATIENT
Start: 2021-06-09 | End: 2021-06-09

## 2021-06-09 RX ORDER — TETRACAINE HYDROCHLORIDE 5 MG/ML
SOLUTION OPHTHALMIC PRN
Status: DISCONTINUED | OUTPATIENT
Start: 2021-06-09 | End: 2021-06-09 | Stop reason: HOSPADM

## 2021-06-09 RX ORDER — FENTANYL CITRATE 50 UG/ML
INJECTION, SOLUTION INTRAMUSCULAR; INTRAVENOUS PRN
Status: DISCONTINUED | OUTPATIENT
Start: 2021-06-09 | End: 2021-06-09

## 2021-06-09 RX ORDER — ERYTHROMYCIN 5 MG/G
OINTMENT OPHTHALMIC PRN
Status: DISCONTINUED | OUTPATIENT
Start: 2021-06-09 | End: 2021-06-09 | Stop reason: HOSPADM

## 2021-06-09 RX ORDER — ONDANSETRON 2 MG/ML
INJECTION INTRAMUSCULAR; INTRAVENOUS PRN
Status: DISCONTINUED | OUTPATIENT
Start: 2021-06-09 | End: 2021-06-09

## 2021-06-09 RX ORDER — SODIUM CHLORIDE, SODIUM LACTATE, POTASSIUM CHLORIDE, CALCIUM CHLORIDE 600; 310; 30; 20 MG/100ML; MG/100ML; MG/100ML; MG/100ML
500 INJECTION, SOLUTION INTRAVENOUS CONTINUOUS
Status: DISCONTINUED | OUTPATIENT
Start: 2021-06-09 | End: 2021-06-10 | Stop reason: HOSPADM

## 2021-06-09 RX ORDER — ERYTHROMYCIN 5 MG/G
OINTMENT OPHTHALMIC
Qty: 3.5 G | Refills: 0 | Status: SHIPPED | OUTPATIENT
Start: 2021-06-09

## 2021-06-09 RX ORDER — LIDOCAINE 40 MG/G
CREAM TOPICAL
Status: DISCONTINUED | OUTPATIENT
Start: 2021-06-09 | End: 2021-06-10 | Stop reason: HOSPADM

## 2021-06-09 RX ADMIN — GABAPENTIN 300 MG: 300 CAPSULE ORAL at 10:20

## 2021-06-09 RX ADMIN — PROPOFOL 30 MG: 10 INJECTION, EMULSION INTRAVENOUS at 11:54

## 2021-06-09 RX ADMIN — SODIUM CHLORIDE, SODIUM LACTATE, POTASSIUM CHLORIDE, CALCIUM CHLORIDE 500 ML: 600; 310; 30; 20 INJECTION, SOLUTION INTRAVENOUS at 10:28

## 2021-06-09 RX ADMIN — PROPOFOL 10 MG: 10 INJECTION, EMULSION INTRAVENOUS at 12:07

## 2021-06-09 RX ADMIN — ONDANSETRON 4 MG: 2 INJECTION INTRAMUSCULAR; INTRAVENOUS at 11:43

## 2021-06-09 RX ADMIN — PROPOFOL 50 MG: 10 INJECTION, EMULSION INTRAVENOUS at 11:50

## 2021-06-09 RX ADMIN — PROPOFOL 10 MG: 10 INJECTION, EMULSION INTRAVENOUS at 12:02

## 2021-06-09 RX ADMIN — ACETAMINOPHEN 975 MG: 325 TABLET ORAL at 10:20

## 2021-06-09 RX ADMIN — FENTANYL CITRATE 50 MCG: 50 INJECTION, SOLUTION INTRAMUSCULAR; INTRAVENOUS at 11:43

## 2021-06-09 ASSESSMENT — MIFFLIN-ST. JEOR: SCORE: 1675.46

## 2021-06-09 NOTE — BRIEF OP NOTE
[facility] Brief Operative Note    Pre-operative diagnosis: Dermatochalasis of both upper eyelids [H02.831, H02.834]   Post-operative diagnosis same   Procedure: Procedure(s):  Bilateral upper eyelid blepharoplasty   Surgeon(s): Surgeon(s) and Role:     * Nirav Freitas MD - Primary     * David Terrazas MD - Fellow - Assisting   Estimated blood loss: 2 mL    Specimens: * No specimens in log *   Findings: As expected

## 2021-06-09 NOTE — OP NOTE
PREOPERATIVE DIAGNOSIS: Bilateral upper eyelid dermatochalasis.     POSTOPERATIVE DIAGNOSIS: Bilateral upper eyelid dermatochalasis.     PROCEDURE: Bilateral upper blepharoplasty.     SURGEON: Nirav Freitas MD.     ASSISTANT: David Terrazas MD     ASSISTANT: Margie Del Rio MD    ANESTHESIA: Monitored with local infiltration of 1% lidocaine with epinephrine.     COMPLICATIONS: None.     ESTIMATED BLOOD LOSS: Less than 5 mL.     HISTORY: Akira Bianchi  presented with upper lid dermatochalasis leading to mechanical ptosis of the upper lids, blocking the superior visual field and interfering with  activities of daily living. After the risks, benefits and alternatives to the proposed procedure were explained, informed consent was obtained.     DESCRIPTION OF PROCEDURE: Akira Bianchi was brought to the operating room and placed supine on the operating table. IV sedation was given. The upper lid crease and excess upper eyelid skin was marked with marking pen and infiltrated with local anesthetic. The area was prepped and draped in the typical fashion. Attention was directed to the right side. Skin was incised following marked lines. Skin and orbicularis muscle flap was excised with a high temperature cautery. A row of cautery was placed in the orbicularis along the inferior incision line.  Hemostasis was obtained and the skin closed with running 6-0 plain gut suture. Attention was directed to the left side where the same procedure was performed.  Ophthalmic antibiotic ointment was applied to the eyelids and into the eyes. Akira Bianchi tolerated the procedure well and left the operating room in stable condition.       Nirav Freitas MD

## 2021-06-09 NOTE — ANESTHESIA POSTPROCEDURE EVALUATION
Patient: Akira Bianchi    Procedure(s):  Bilateral upper eyelid blepharoplasty    Diagnosis:Dermatochalasis of both upper eyelids [H02.831, H02.834]  Diagnosis Additional Information: No value filed.    Anesthesia Type:  MAC    Note:  Disposition: Outpatient   Postop Pain Control: Uneventful            Sign Out: Well controlled pain   PONV: No   Neuro/Psych: Uneventful            Sign Out: Acceptable/Baseline neuro status   Airway/Respiratory: Uneventful            Sign Out: Acceptable/Baseline resp. status   CV/Hemodynamics: Uneventful            Sign Out: Acceptable CV status; No obvious hypovolemia; No obvious fluid overload   Other NRE: NONE   DID A NON-ROUTINE EVENT OCCUR? No           Last vitals:  Vitals:    06/09/21 1004 06/09/21 1230 06/09/21 1245   BP: (!) 166/103 (!) 149/89 (!) 150/87   Pulse: 79 75 78   Resp: 18 17 16   Temp: 36.4  C (97.5  F) 36.1  C (97  F) 36.1  C (97  F)   SpO2: 99% 100% 97%       Last vitals prior to Anesthesia Care Transfer:  CRNA VITALS  6/9/2021 1153 - 6/9/2021 1253      6/9/2021             Pulse:  92    SpO2:  94 %    Resp Rate (set):  10          Electronically Signed By: Antoine Weiss MD  June 9, 2021  1:11 PM

## 2021-06-09 NOTE — DISCHARGE INSTRUCTIONS
Post-operative Instructions     Ophthalmic Plastic and Reconstructive Surgery  Nirav Freitas M.D.  David Terrazas M.D., M.P.H.     All instructions apply to the operated eye(s) or eyelid(s)     What to expect after surgery:    There will be some swelling, bruising, and likely a black eye (even into the lower eyelids and cheeks). Also expect crusting and discharge from the eye and/or incisions.     A small amount of surface bleeding is normal for the first 48 hours after surgery.    You may notice some bloody tears for the first few days after surgery. This is normal.    Your eye(s) and eyelid(s) may be painful and tender. This is normal after surgery. Use the pain medication as prescribed. If your pain does not improve despite the medication, contact the office.     Wound care and personal care:    If a patch or bandage has been placed, please leave this in place until seen in clinic. Prevent the bandage from getting wet.     Apply ice compresses 15 minutes on 15 minutes off while awake for the first 2 days after surgery, then switch to warm compresses 4 times a day until seen by your physician.   ? For warm packs you can place a cup of dry uncooked rice in a clean cotton sock. Place sock in microwave 30 seconds to one minute. Next place the warm sock into a plastic bag and wrap the bag with clean warm wet washcloth and place over operated eye.      You may shower or wash your hair the day after surgery. Do not bathe or go swimming for 1 week to prevent contamination of your wounds.    Do not apply make-up to the eyes or eyelids for 2 weeks after surgery.     Activity restrictions and driving:    Avoid heavy lifting, bending, exercise or strenuous activity for 1 week after surgery.    You may resume other activities and return to work as tolerated.    You may not resume driving until have you stopped using narcotic pain medications(such as Norco, Percocet, Tylenol #3).    Sinus Precautions for 1 week:  Sneeze with mouth open. Cough with mouth open. No blowing nose. No straws. No bending over.     Medications:    Restart all your regular home medications and eye drops today. If you take Plavix or Aspirin on a regular basis, wait for 3 days after your surgery before restarting these in order to decrease the risk of bleeding complications.    Avoid aspirin and aspirin-like medications (Motrin, Aleve, Ibuprofen, Heydi-Lima etc) for 5 days to reduce the risk of bleeding. You may take Tylenol (acetaminophen) for pain.    In addition to your home medications, take the following post-operative medications as prescribed by your physician:  ? Apply antibiotic ointment (erythromycin) to all sutures three times a day, and into the operated eye(s) at night.   ? Take scheduled extra strength Tylenol for pain.  You may take 1 to 2 pain pills (norco or oxycodone as prescribed) as needed for breakthrough pain up to every 6 hours.    The pain pills may make you drowsy. You must not drive a car, operate heavy machinery or drink alcohol while taking them.    The pain pills may cause constipation and nausea. Take them with some food to prevent a stomach upset. If you continue to experience nausea, call your physician.       WARNING: All the prescription pain medications listed above contain Tylenol (acetaminophen). You must not take more than 4,000 mg of acetaminophen per 24-hour period. This is equivalent to 6 tablets of Darvocet, 8 tablets of Vicodin, or 12 tablets of Norco, Percocet or Tylenol #3. If you take other over-the-counter medications containing acetaminophen, you must take the amount of acetaminophen into account and reduce the number of prescribed pain pills accordingly.     Contact information and follow-up:    Return to the Eye Clinic for a follow-up appointment with your physician as  scheduled. If no appointment has been scheduled, call [phone] for an  appointment with Dr. Freitas within 1 to 2 weeks from  your date of surgery.  -     Please email a few photos of your eye(s) or other operative site(s) to [email or web address] prior to your follow up visit.       For severe pain, bleeding, or loss of vision, call the Eye Clinic at [phone].    After hours or on weekends and holidays, call [phone] and ask to speak with the ophthalmologist on call.    [facility]  Home Care Following Anesthesia  For 24 hours after surgery:  1. Get plenty of rest.  A responsible adult must stay with you for at least 24 hours after you leave the surgery center.  2. Do not drive or use heavy equipment.  If you have weakness or tingling, don't drive or use heavy equipment until this feeling goes away.   3. Do not drink alcohol.   4. Avoid strenuous or risky activities.  Ask for help when climbing stairs.  5. You may feel lightheaded.  IF so, sit for a few minutes before standing.  Have someone help you get up.   6. If you have nausea (feel sick to your stomach): Drink only clear liquids such as apple juice, ginger ale, broth or 7-Up.  Rest may also help.  Be sure to drink enough fluids.  Move to a regular diet as you feel able.   7. You may have a slight fever.  Call the doctor if your fever is over 100 F (37.7 C) (taken under the tongue) or lasts longer than 24 hours.  8. You may have a dry mouth, a sore throat, muscle aches or trouble sleeping. These should go away after 24 hours.  9. Do not make important or legal decisions.   10. It is recommended to avoid smoking.               Tips for taking pain medications  To get the best pain relief possible, remember these points:    Take pain medications as directed, before pain becomes severe.    Pain medication can upset your stomach: taking it with food may help.    Constipation is a common side effect of pain medication. Drink plenty of  fluids.    Eat foods high in fiber. Take a stool softener if recommended by your doctor or pharmacist.    Do  not drink alcohol, drive or operate machinery while taking pain medications.    Ask about other ways to control pain, such as with heat, ice or relaxation.    Tylenol/Acetaminophen Consumption  To help encourage the safe use of acetaminophen, the makers of TYLENOL  have lowered the maximum daily dose for single-ingredient Extra Strength TYLENOL  (acetaminophen) products sold in the [place] from 8 pills per day (4,000 mg) to 6 pills per day (3,000 mg). The dosing interval has also changed from 2 pills every 4-6 hours to 2 pills every 6 hours.    If you feel your pain relief is insufficient, you may take Tylenol/Acetaminophen in addition to your narcotic pain medication.     Be careful not to exceed 3,000 mg of Tylenol/Acetaminophen in a 24 hour period from all sources.    If you are taking extra strength Tylenol/acetaminophen (500 mg), the maximum dose is 6 tablets in 24 hours.    If you are taking regular strength acetaminophen (325 mg), the maximum dose is 9 tablets in 24 hours.    You received a dose of  975 mg of Tylenol @ 10:20 AM. You may take another dose of Tylenol @ 4:20 PM .       Call a doctor for any of the followin. Signs of infection (fever, growing tenderness at the surgery site, a large amount of drainage or bleeding, severe pain, foul-smelling drainage, redness, swelling).  2. It has been over 8 to 10 hours since surgery and you are still not able to urinate (pass water).  3. Headache for over 24 hours.  4. Numbness, tingling or weakness the day after surgery (if you had spinal anesthesia).  5. Signs of Covid-19 infection (temperature over 100 degrees, shortness of breath, cough, loss of taste/smell, generalized body aches, persistent headache, chills, sore throat, nausea/vomiting/diarrhea)  Your doctor is:  Dr. Nirav Freitas, Ophthalmology: [phone]                    Or dial [phone] and ask for the resident on call for:  Ophthalmology  For emergency care, call the:  [facility]  Department:  [phone] (TTY for hearing impaired: [phone])

## 2021-06-09 NOTE — ANESTHESIA CARE TRANSFER NOTE
Patient: Akira Bianchi    Procedure(s):  Bilateral upper eyelid blepharoplasty    Diagnosis: Dermatochalasis of both upper eyelids [H02.831, H02.834]  Diagnosis Additional Information: No value filed.    Anesthesia Type:   MAC     Note:    Oropharynx: spontaneously breathing  Level of Consciousness: awake  Oxygen Supplementation: room air    Independent Airway: airway patency satisfactory and stable  Dentition: dentition unchanged  Vital Signs Stable: post-procedure vital signs reviewed and stable  Report to RN Given: handoff report given  Patient transferred to: Phase II    Handoff Report: Identifed the Patient, Identified the Reponsible Provider, Reviewed the pertinent medical history, Discussed the surgical course, Reviewed Intra-OP anesthesia mangement and issues during anesthesia, Set expectations for post-procedure period and Allowed opportunity for questions and acknowledgement of understanding      Vitals: (Last set prior to Anesthesia Care Transfer)  CRNA VITALS  6/9/2021 1153 - 6/9/2021 1228      6/9/2021             Pulse:  92    SpO2:  94 %    Resp Rate (set):  10        Electronically Signed By: NABEEL BUCHANAN CRNA  June 9, 2021  12:28 PM

## 2021-06-10 ENCOUNTER — TELEPHONE (OUTPATIENT)
Dept: OPHTHALMOLOGY | Facility: CLINIC | Age: 51
End: 2021-06-10

## 2021-06-10 NOTE — TELEPHONE ENCOUNTER
POD1  A telephone call was made to Akira Bianchi to make sure the post operative course has been uneventful and that all questions were answered. There was no answer and a telephone message was left.    Nirav Freitas MD

## 2021-06-19 ENCOUNTER — HEALTH MAINTENANCE LETTER (OUTPATIENT)
Age: 51
End: 2021-06-19

## 2021-06-21 ENCOUNTER — OFFICE VISIT (OUTPATIENT)
Dept: OPHTHALMOLOGY | Facility: CLINIC | Age: 51
End: 2021-06-21
Payer: COMMERCIAL

## 2021-06-21 DIAGNOSIS — Z98.890 POSTOPERATIVE EYE STATE: Primary | ICD-10-CM

## 2021-06-21 DIAGNOSIS — H02.831 DERMATOCHALASIS OF BOTH UPPER EYELIDS: ICD-10-CM

## 2021-06-21 DIAGNOSIS — H02.834 DERMATOCHALASIS OF BOTH UPPER EYELIDS: ICD-10-CM

## 2021-06-21 PROCEDURE — 99024 POSTOP FOLLOW-UP VISIT: CPT | Mod: GC | Performed by: OPHTHALMOLOGY

## 2021-06-21 ASSESSMENT — TONOMETRY
IOP_METHOD: ICARE
OS_IOP_MMHG: 22
OD_IOP_MMHG: 18

## 2021-06-21 ASSESSMENT — VISUAL ACUITY
OS_CC: 20/20
OD_CC+: -2
OD_CC: 20/20
METHOD: SNELLEN - LINEAR

## 2021-06-21 NOTE — NURSING NOTE
"Chief Complaints and History of Present Illnesses   Patient presents with     Post Op (Ophthalmology) Both Eyes     POD#12 s/p BULB     Chief Complaint(s) and History of Present Illness(es)     Post Op (Ophthalmology) Both Eyes     Laterality: both eyes    Associated symptoms: Negative for eye pain, burning, itching and discharge    Pain scale: 0/10    Comments: POD#12 s/p BULB              Comments     Pt notes that there may be a \"cyst\" in the inner corner of the right eye, it was very irritated, pt has \"weaned\" off the EES ness, first few days used 3 times daily, has been doing about every other day currently. Denies bleeding or pain. Pt notes FBS, feels like allergy/itchy eyes, in BE, comes and goes, he is wondering if there are sutures poking inside the eyes.     Irene Mancilla COT June 21, 2021 10:17 AM                  "

## 2021-06-21 NOTE — PROGRESS NOTES
"Chief Complaints and History of Present Illnesses   Patient presents with     Post Op (Ophthalmology) Both Eyes     POD#12 s/p BULB     Chief Complaint(s) and History of Present Illness(es)     Post Op (Ophthalmology) Both Eyes     In both eyes.  Associated symptoms include Negative for eye pain,   burning, itching and discharge.  Pain was noted as 0/10. Additional   comments: POD#12 s/p BULB              Comments     Pt notes that there may be a \"cyst\" in the inner corner of the right eye,   it was very irritated, pt has \"weaned\" off the EES ness, first few days   used 3 times daily, has been doing about every other day currently. Denies   bleeding or pain. Pt notes FBS, feels like allergy/itchy eyes, in BE,   comes and goes, he is wondering if there are sutures poking inside the   eyes.     Irene HOSKINS June 21, 2021 10:17 AM                     Assessment & Plan     Akira Bianchi is a 51 year old male with the following diagnoses:   1. Postoperative eye state    2. Dermatochalasis of both upper eyelids       Akira Bianchi is 2 weeks status post bilateral upper blepharoplasty  The incision(s) are healing well.  The lid(s) are in excellent position.  He notices a little redundant skin fold nasal right upper eyelid that is asymmetric.  Still has some edema, this may resolve with time.  Will plan to observe for now and if needed after swelling has resolved and skin has healed can do a small pinch bleph.     I have recommended:  * Continue antibiotic ointment or bland lubricating ointment (eg vaseline or aquaphor) to the incision site BID  * Massage along the incision BID  * Warm soaks QID until all edema and ecchymoses resolve  * Return to clinic in 6 weeks    David Terrazas MD/MPH  Oculoplastics Fellow  6/21/2021 at 10:32 AM    Attending Physician Attestation:  I have seen and examined this patient.  I have confirmed and edited as necessary the chief complaint(s), history of present illness, review " of systems, relevant history, and examination findings as documented by others.  I have personally reviewed the relevant tests, images, and reports as documented above.  I have confirmed and edited as necessary the assessment and plan and agree with this note.    - Nirav Freitas MD 10:32 AM 6/21/2021

## 2021-08-20 ENCOUNTER — OFFICE VISIT (OUTPATIENT)
Dept: OPHTHALMOLOGY | Facility: CLINIC | Age: 51
End: 2021-08-20
Payer: COMMERCIAL

## 2021-08-20 DIAGNOSIS — Z98.890 POSTOPERATIVE EYE STATE: Primary | ICD-10-CM

## 2021-08-20 PROBLEM — E11.9 DIABETES MELLITUS, TYPE 2 (H): Status: ACTIVE | Noted: 2021-08-20

## 2021-08-20 PROCEDURE — 92285 EXTERNAL OCULAR PHOTOGRAPHY: CPT | Performed by: OPHTHALMOLOGY

## 2021-08-20 PROCEDURE — 99024 POSTOP FOLLOW-UP VISIT: CPT | Performed by: OPHTHALMOLOGY

## 2021-08-20 ASSESSMENT — TONOMETRY
IOP_METHOD: ICARE
OD_IOP_MMHG: 16
OS_IOP_MMHG: 18

## 2021-08-20 ASSESSMENT — VISUAL ACUITY
OS_SC+: -2
OD_SC: 20/20
METHOD: SNELLEN - LINEAR
OD_SC+: -2
OS_SC: 20/20

## 2021-08-20 ASSESSMENT — EXTERNAL EXAM - LEFT EYE: OS_EXAM: NORMAL

## 2021-08-20 ASSESSMENT — EXTERNAL EXAM - RIGHT EYE: OD_EXAM: NORMAL

## 2021-08-20 NOTE — NURSING NOTE
Chief Complaints and History of Present Illnesses   Patient presents with     Follow Up     Status post Bilateral Upper Blepharoplasty 6/9/21     Chief Complaint(s) and History of Present Illness(es)     Follow Up     Laterality: both eyes    Associated symptoms: redness.  Negative for eye pain and tearing    Pain scale: 0/10    Comments: Status post Bilateral Upper Blepharoplasty 6/9/21              Comments     Very happy with results. Things are great. No issues or concerns. Lids sometimes get a little red when tired.   Yvette Blal, COT COT 9:46 AM August 20, 2021

## 2021-08-20 NOTE — PROGRESS NOTES
Chief Complaints and History of Present Illnesses   Patient presents with     Follow Up     Status post Bilateral Upper Blepharoplasty 6/9/21     Chief Complaint(s) and History of Present Illness(es)     Follow Up     In both eyes.  Associated symptoms include redness.  Negative for eye   pain and tearing.  Pain was noted as 0/10. Additional comments: Status   post Bilateral Upper Blepharoplasty 6/9/21              Comments     Very happy with results. Things are great. No issues or concerns. Lids   sometimes get a little red when tired.   Yvette Quinn, COT COT 9:46 AM August 20, 2021                  Assessment & Plan     Akira Binachi is a 51 year old male with the following diagnoses:   1. Postoperative eye state      - healed, looks great!      PLAN:  Observe   Return to clinic as needed           Attending Physician Attestation:  Complete documentation of historical and exam elements from today's encounter can be found in the full encounter summary report (not reduplicated in this progress note).  I personally obtained the chief complaint(s) and history of present illness.  I confirmed and edited as necessary the review of systems, past medical/surgical history, family history, social history, and examination findings as documented by others; and I examined the patient myself.  I personally reviewed the relevant tests, images, and reports as documented above.  I formulated and edited as necessary the assessment and plan and discussed the findings and management plan with the patient and family.   -Nirav Freitas MD  10:08 AM 8/20/2021

## 2021-10-04 ENCOUNTER — HEALTH MAINTENANCE LETTER (OUTPATIENT)
Age: 51
End: 2021-10-04

## 2022-01-23 ENCOUNTER — HEALTH MAINTENANCE LETTER (OUTPATIENT)
Age: 52
End: 2022-01-23

## 2022-05-15 ENCOUNTER — HEALTH MAINTENANCE LETTER (OUTPATIENT)
Age: 52
End: 2022-05-15

## 2022-07-10 ENCOUNTER — HEALTH MAINTENANCE LETTER (OUTPATIENT)
Age: 52
End: 2022-07-10

## 2022-09-11 ENCOUNTER — HEALTH MAINTENANCE LETTER (OUTPATIENT)
Age: 52
End: 2022-09-11

## 2023-01-22 ENCOUNTER — HEALTH MAINTENANCE LETTER (OUTPATIENT)
Age: 53
End: 2023-01-22

## 2023-04-30 ENCOUNTER — HEALTH MAINTENANCE LETTER (OUTPATIENT)
Age: 53
End: 2023-04-30

## 2023-07-29 ENCOUNTER — HEALTH MAINTENANCE LETTER (OUTPATIENT)
Age: 53
End: 2023-07-29

## 2023-10-07 ENCOUNTER — HEALTH MAINTENANCE LETTER (OUTPATIENT)
Age: 53
End: 2023-10-07

## 2024-02-24 ENCOUNTER — HEALTH MAINTENANCE LETTER (OUTPATIENT)
Age: 54
End: 2024-02-24

## 2024-07-13 ENCOUNTER — HEALTH MAINTENANCE LETTER (OUTPATIENT)
Age: 54
End: 2024-07-13

## (undated) DEVICE — GLOVE PROTEXIS MICRO 7.5  2D73PM75

## (undated) DEVICE — EYE PREP BETADINE 5% SOLUTION 30ML 0065-0411-30

## (undated) DEVICE — PACK MINOR EYE CUSTOM ASC

## (undated) DEVICE — ESU NDL COLORADO MICRO 3CM STR N103A

## (undated) DEVICE — LINEN TOWEL PACK X5 5464

## (undated) DEVICE — ESU PENCIL W/COATED BLADE E2450H

## (undated) DEVICE — SOL WATER IRRIG 500ML BOTTLE 2F7113

## (undated) DEVICE — ESU EYE HIGH TEMP 65410-183

## (undated) RX ORDER — ONDANSETRON 2 MG/ML
INJECTION INTRAMUSCULAR; INTRAVENOUS
Status: DISPENSED
Start: 2021-06-09

## (undated) RX ORDER — PROPOFOL 10 MG/ML
INJECTION, EMULSION INTRAVENOUS
Status: DISPENSED
Start: 2021-06-09

## (undated) RX ORDER — FENTANYL CITRATE 50 UG/ML
INJECTION, SOLUTION INTRAMUSCULAR; INTRAVENOUS
Status: DISPENSED
Start: 2021-06-09

## (undated) RX ORDER — ACETAMINOPHEN 325 MG/1
TABLET ORAL
Status: DISPENSED
Start: 2021-06-09

## (undated) RX ORDER — LIDOCAINE HYDROCHLORIDE 20 MG/ML
INJECTION, SOLUTION EPIDURAL; INFILTRATION; INTRACAUDAL; PERINEURAL
Status: DISPENSED
Start: 2021-06-09

## (undated) RX ORDER — GABAPENTIN 300 MG/1
CAPSULE ORAL
Status: DISPENSED
Start: 2021-06-09